# Patient Record
Sex: MALE | Race: BLACK OR AFRICAN AMERICAN | NOT HISPANIC OR LATINO | RURAL
[De-identification: names, ages, dates, MRNs, and addresses within clinical notes are randomized per-mention and may not be internally consistent; named-entity substitution may affect disease eponyms.]

---

## 2021-02-16 NOTE — PATIENT DISCUSSION
Lower eyelid dermatochalasis. The patient is considering repair of their lower eyelid dermatochalasis. Discussed risks and benefits of a tranconjunctival lower eyelid blepharoplasty with laser resurfacing and cauterization of vein right lower eyelid. The patient is aware of risk of prolonged redness from laser resurfacing, which may last up to 6 months in normal cases. The patient was referred to the website for further information. The patient understands that this portion is cosmetic and will schedule surgery at their convenience.

## 2022-06-06 ENCOUNTER — NEW PATIENT (OUTPATIENT)
Dept: RURAL CLINIC 1 | Facility: CLINIC | Age: 32
End: 2022-06-06

## 2022-06-06 DIAGNOSIS — H26.492: ICD-10-CM

## 2022-06-06 DIAGNOSIS — Z96.1: ICD-10-CM

## 2022-06-06 PROCEDURE — 99204 OFFICE O/P NEW MOD 45 MIN: CPT

## 2022-06-06 ASSESSMENT — TONOMETRY
OD_IOP_MMHG: 17
OS_IOP_MMHG: 18

## 2022-06-06 ASSESSMENT — VISUAL ACUITY
OD_CC: 20/20
OU_CC: 20/25
OU_CC: J1+
OS_PH: 20/60
OS_CC: 20/100

## 2022-07-14 ENCOUNTER — CONSULTATION/EVALUATION (OUTPATIENT)
Dept: RURAL CLINIC 1 | Facility: CLINIC | Age: 32
End: 2022-07-14

## 2022-07-14 DIAGNOSIS — Z96.1: ICD-10-CM

## 2022-07-14 DIAGNOSIS — H26.492: ICD-10-CM

## 2022-07-14 PROCEDURE — 92004 COMPRE OPH EXAM NEW PT 1/>: CPT

## 2022-07-14 PROCEDURE — 66821 AFTER CATARACT LASER SURGERY: CPT

## 2022-07-14 ASSESSMENT — VISUAL ACUITY
OD_BAT: 20/50
OS_SC: 20/200
OS_SC: 20/200
OD_SC: 20/40-1
OS_BAT: 20/400
OD_SC: 20/40

## 2022-07-14 ASSESSMENT — TONOMETRY
OS_IOP_MMHG: 18
OD_IOP_MMHG: 18

## 2022-08-04 ENCOUNTER — CONSULTATION/EVALUATION (OUTPATIENT)
Dept: RURAL CLINIC 1 | Facility: CLINIC | Age: 32
End: 2022-08-04

## 2022-08-04 DIAGNOSIS — H26.491: ICD-10-CM

## 2022-08-04 PROCEDURE — 66821 AFTER CATARACT LASER SURGERY: CPT

## 2022-08-04 PROCEDURE — 92014 COMPRE OPH EXAM EST PT 1/>: CPT

## 2022-08-04 ASSESSMENT — VISUAL ACUITY
OS_SC: 20/100
OD_SC: 20/40
OS_SC: 20/25-1
OD_SC: 20/40-2
OD_BAT: 20/50

## 2022-08-04 ASSESSMENT — TONOMETRY
OD_IOP_MMHG: 16
OS_IOP_MMHG: 16

## 2022-08-18 ENCOUNTER — POST-OP (OUTPATIENT)
Dept: RURAL CLINIC 1 | Facility: CLINIC | Age: 32
End: 2022-08-18

## 2022-08-18 DIAGNOSIS — Z98.890: ICD-10-CM

## 2022-08-18 PROCEDURE — 99024 POSTOP FOLLOW-UP VISIT: CPT

## 2022-08-18 ASSESSMENT — VISUAL ACUITY
OS_SC: 20/70-1
OU_SC: 20/20-1
OD_SC: 20/25-1
OS_PH: 20/40-1

## 2022-08-18 ASSESSMENT — TONOMETRY
OD_IOP_MMHG: 16
OS_IOP_MMHG: 14

## 2023-06-13 ENCOUNTER — ESTABLISHED PATIENT (OUTPATIENT)
Dept: RURAL CLINIC 1 | Facility: CLINIC | Age: 33
End: 2023-06-13

## 2023-06-13 DIAGNOSIS — Z96.1: ICD-10-CM

## 2023-06-13 DIAGNOSIS — H52.4: ICD-10-CM

## 2023-06-13 PROCEDURE — 92014 COMPRE OPH EXAM EST PT 1/>: CPT

## 2023-06-13 PROCEDURE — 92310-E CONTACT LENS FITTING ESTABLISH PATIENT

## 2023-06-13 PROCEDURE — 92015 DETERMINE REFRACTIVE STATE: CPT

## 2023-06-13 ASSESSMENT — VISUAL ACUITY
OU_CC: 20/30
OS_CC: 20/20
OD_CC: 20/20

## 2023-06-13 ASSESSMENT — TONOMETRY
OS_IOP_MMHG: 18
OD_IOP_MMHG: 18

## 2025-06-12 ENCOUNTER — COMPREHENSIVE EXAM (OUTPATIENT)
Age: 35
End: 2025-06-12

## 2025-06-12 DIAGNOSIS — Z96.1: ICD-10-CM

## 2025-06-12 DIAGNOSIS — H52.4: ICD-10-CM

## 2025-06-12 PROCEDURE — 92310-1 LEVEL 1 SOFT LENS UPDATE

## 2025-06-12 PROCEDURE — 92015 DETERMINE REFRACTIVE STATE: CPT

## 2025-06-12 PROCEDURE — 92014 COMPRE OPH EXAM EST PT 1/>: CPT
